# Patient Record
Sex: MALE | Race: WHITE | NOT HISPANIC OR LATINO | Employment: UNEMPLOYED | URBAN - METROPOLITAN AREA
[De-identification: names, ages, dates, MRNs, and addresses within clinical notes are randomized per-mention and may not be internally consistent; named-entity substitution may affect disease eponyms.]

---

## 2023-09-27 ENCOUNTER — OFFICE VISIT (OUTPATIENT)
Dept: URGENT CARE | Facility: CLINIC | Age: 83
End: 2023-09-27
Payer: MEDICARE

## 2023-09-27 VITALS
BODY MASS INDEX: 26.21 KG/M2 | HEART RATE: 61 BPM | OXYGEN SATURATION: 98 % | DIASTOLIC BLOOD PRESSURE: 81 MMHG | SYSTOLIC BLOOD PRESSURE: 158 MMHG | TEMPERATURE: 98 F | RESPIRATION RATE: 16 BRPM | WEIGHT: 167 LBS | HEIGHT: 67 IN

## 2023-09-27 DIAGNOSIS — R09.89 SYMPTOMS OF UPPER RESPIRATORY INFECTION (URI): Primary | ICD-10-CM

## 2023-09-27 DIAGNOSIS — J02.9 SORE THROAT: ICD-10-CM

## 2023-09-27 DIAGNOSIS — Z20.818 EXPOSURE TO STREP THROAT: ICD-10-CM

## 2023-09-27 LAB
CTP QC/QA: YES
FLUAV AG NPH QL: NEGATIVE
FLUBV AG NPH QL: NEGATIVE
MOLECULAR STREP A: NEGATIVE
SARS-COV-2 RDRP RESP QL NAA+PROBE: NEGATIVE

## 2023-09-27 PROCEDURE — 87651 STREP A DNA AMP PROBE: CPT | Mod: PBBFAC

## 2023-09-27 PROCEDURE — 99205 OFFICE O/P NEW HI 60 MIN: CPT | Mod: PBBFAC

## 2023-09-27 PROCEDURE — 87804 INFLUENZA ASSAY W/OPTIC: CPT | Mod: 59,PBBFAC

## 2023-09-27 PROCEDURE — 99204 PR OFFICE/OUTPT VISIT, NEW, LEVL IV, 45-59 MIN: ICD-10-PCS | Mod: S$PBB,,,

## 2023-09-27 PROCEDURE — 99204 OFFICE O/P NEW MOD 45 MIN: CPT | Mod: S$PBB,,,

## 2023-09-27 PROCEDURE — 87635 SARS-COV-2 COVID-19 AMP PRB: CPT | Mod: PBBFAC

## 2023-09-27 RX ORDER — OXYCODONE AND ACETAMINOPHEN 5; 325 MG/1; MG/1
1 TABLET ORAL
COMMUNITY
Start: 2023-09-12

## 2023-09-27 RX ORDER — UMECLIDINIUM BROMIDE AND VILANTEROL TRIFENATATE 62.5; 25 UG/1; UG/1
POWDER RESPIRATORY (INHALATION)
COMMUNITY
Start: 2023-06-09

## 2023-09-27 RX ORDER — TRAMADOL HYDROCHLORIDE 50 MG/1
50 TABLET ORAL EVERY 6 HOURS PRN
COMMUNITY
Start: 2023-08-14

## 2023-09-27 RX ORDER — AMOXICILLIN 500 MG/1
500 TABLET, FILM COATED ORAL EVERY 12 HOURS
Qty: 20 TABLET | Refills: 0 | Status: SHIPPED | OUTPATIENT
Start: 2023-09-27 | End: 2023-10-07

## 2023-09-27 RX ORDER — AMLODIPINE BESYLATE 10 MG/1
10 TABLET ORAL
COMMUNITY
Start: 2023-09-15

## 2023-09-27 RX ORDER — ONDANSETRON 4 MG/1
4 TABLET, ORALLY DISINTEGRATING ORAL EVERY 8 HOURS PRN
COMMUNITY
Start: 2023-09-12

## 2023-09-27 RX ORDER — PROMETHAZINE HYDROCHLORIDE AND DEXTROMETHORPHAN HYDROBROMIDE 6.25; 15 MG/5ML; MG/5ML
5 SYRUP ORAL EVERY 6 HOURS PRN
Qty: 180 ML | Refills: 0 | Status: SHIPPED | OUTPATIENT
Start: 2023-09-27 | End: 2023-10-07

## 2023-09-27 RX ORDER — FLUTICASONE PROPIONATE 50 MCG
1 SPRAY, SUSPENSION (ML) NASAL DAILY
Qty: 9.9 ML | Refills: 0 | Status: SHIPPED | OUTPATIENT
Start: 2023-09-27

## 2023-09-27 NOTE — PROGRESS NOTES
"Subjective:      Patient ID: Christopher Quiroga is a 83 y.o. male.    Vitals:  height is 5' 7" (1.702 m) and weight is 75.8 kg (167 lb). His temperature is 98.2 °F (36.8 °C). His blood pressure is 158/81 (abnormal) and his pulse is 61. His respiration is 16 and oxygen saturation is 98%.     Chief Complaint: URI (Cough, congestion, sore throat, fatigue x 3 days.)    PT states cough, sore throat and body aches and fatigue for the last 3 days. Pt states granddaughter also sick. Denies fever or SOB.      URI   Associated symptoms include congestion, coughing and a sore throat.       Constitution: Negative.   HENT:  Positive for congestion and sore throat.    Neck: neck negative.   Cardiovascular: Negative.    Eyes: Negative.    Respiratory:  Positive for cough and COPD.    Gastrointestinal: Negative.    Genitourinary: Negative.    Musculoskeletal: Negative.    Skin: Negative.    Neurological: Negative.       Objective:     Physical Exam   Constitutional: He is oriented to person, place, and time. normal  HENT:   Head: Normocephalic.   Ears:   Right Ear: Tympanic membrane, external ear and ear canal normal.   Left Ear: Tympanic membrane, external ear and ear canal normal.   Nose: Congestion present.   Mouth/Throat: Uvula is midline and mucous membranes are normal. Mucous membranes are moist. Posterior oropharyngeal edema present. Oropharynx is clear.   Eyes: Pupils are equal, round, and reactive to light.   Neck: Neck supple.   Cardiovascular: Normal rate, regular rhythm, normal heart sounds and normal pulses.   Pulmonary/Chest: Effort normal and breath sounds normal.   Abdominal: Normal appearance. Soft.   Musculoskeletal: Normal range of motion.         General: Normal range of motion.   Neurological: He is alert and oriented to person, place, and time.   Skin: Skin is warm and dry.   Vitals reviewed.    Results for orders placed or performed in visit on 09/27/23   POCT COVID-19 Rapid Screening   Result Value Ref Range    " POC Rapid COVID Negative Negative     Acceptable Yes    POCT Influenza A/B   Result Value Ref Range    Rapid Influenza A Ag Negative Negative    Rapid Influenza B Ag Negative Negative     Acceptable Yes    POCT Strep A, Molecular   Result Value Ref Range    Molecular Strep A, POC Negative Negative     Acceptable Yes          Assessment:     1. Symptoms of upper respiratory infection (URI)    2. Sore throat    3. Exposure to strep throat        Plan:       Symptoms of upper respiratory infection (URI)  -     POCT COVID-19 Rapid Screening  -     POCT Influenza A/B  -     POCT Strep A, Molecular  -     fluticasone propionate (FLONASE) 50 mcg/actuation nasal spray; 1 spray (50 mcg total) by Each Nostril route once daily.  Dispense: 9.9 mL; Refill: 0  -     promethazine-dextromethorphan (PROMETHAZINE-DM) 6.25-15 mg/5 mL Syrp; Take 5 mLs by mouth every 6 (six) hours as needed (cough).  Dispense: 180 mL; Refill: 0    Sore throat  -     POCT COVID-19 Rapid Screening  -     POCT Influenza A/B  -     POCT Strep A, Molecular    Exposure to strep throat  -     amoxicillin (AMOXIL) 500 MG Tab; Take 1 tablet (500 mg total) by mouth every 12 (twelve) hours. for 10 days  Dispense: 20 tablet; Refill: 0             Additional MDM:     Heart Failure Score:   COPD = Yes      Strep is a bacterial infection that may cause a sore, scratchy throat.  The infection is generally transmitted by direct contact is contagious through saliva. Do not share utensils, kiss, or pass saliva to another person.   Begin antibiotics today.   Get a new toothbrush.    Tylenol/ibuprofen as needed for fever and pain.  Drink plenty of fluids.    Soft diet as needed.      Please drink plenty of fluids.  Please get plenty of rest.    Take over the counter Tylenol (Acetaminophen) and/or Motrin (Ibuprofen) as directed for control of pain and/or fever.  Please follow up with your primary care doctor.     ER precautions  given, patient verbalized understanding.     Please see provided patient education for guidance.    Follow up with PCP or return to clinic if symptoms worsen or do not improve.

## 2025-04-17 ENCOUNTER — HOSPITAL ENCOUNTER (EMERGENCY)
Facility: HOSPITAL | Age: 85
Discharge: HOME | End: 2025-04-17
Attending: STUDENT IN AN ORGANIZED HEALTH CARE EDUCATION/TRAINING PROGRAM
Payer: MEDICARE

## 2025-04-17 VITALS
HEIGHT: 67 IN | WEIGHT: 158 LBS | HEART RATE: 56 BPM | RESPIRATION RATE: 18 BRPM | DIASTOLIC BLOOD PRESSURE: 63 MMHG | SYSTOLIC BLOOD PRESSURE: 136 MMHG | OXYGEN SATURATION: 96 % | TEMPERATURE: 98 F | BODY MASS INDEX: 24.8 KG/M2

## 2025-04-17 DIAGNOSIS — J18.9 PNEUMONIA OF RIGHT UPPER LOBE DUE TO INFECTIOUS ORGANISM: Primary | ICD-10-CM

## 2025-04-17 DIAGNOSIS — R06.02 SHORTNESS OF BREATH: ICD-10-CM

## 2025-04-17 DIAGNOSIS — R10.31 RLQ ABDOMINAL PAIN: ICD-10-CM

## 2025-04-17 DIAGNOSIS — J18.9 PNEUMONIA: ICD-10-CM

## 2025-04-17 LAB
ALBUMIN SERPL-MCNC: 3.8 G/DL (ref 3.4–4.8)
ALBUMIN/GLOB SERPL: 1 RATIO (ref 1.1–2)
ALP SERPL-CCNC: 56 UNIT/L (ref 40–150)
ALT SERPL-CCNC: 9 UNIT/L (ref 0–55)
ANION GAP SERPL CALC-SCNC: 9 MEQ/L
AST SERPL-CCNC: 15 UNIT/L (ref 11–45)
BACTERIA #/AREA URNS AUTO: ABNORMAL /HPF
BASOPHILS # BLD AUTO: 0.09 X10(3)/MCL
BASOPHILS NFR BLD AUTO: 0.9 %
BILIRUB SERPL-MCNC: 0.2 MG/DL
BILIRUB UR QL STRIP.AUTO: NEGATIVE
BNP BLD-MCNC: 14.5 PG/ML
BUN SERPL-MCNC: 29.4 MG/DL (ref 8.4–25.7)
CALCIUM SERPL-MCNC: 9.5 MG/DL (ref 8.8–10)
CHLORIDE SERPL-SCNC: 108 MMOL/L (ref 98–107)
CLARITY UR: CLEAR
CO2 SERPL-SCNC: 22 MMOL/L (ref 23–31)
COLOR UR AUTO: ABNORMAL
CREAT SERPL-MCNC: 1.56 MG/DL (ref 0.72–1.25)
CREAT/UREA NIT SERPL: 19
EOSINOPHIL # BLD AUTO: 0.45 X10(3)/MCL (ref 0–0.9)
EOSINOPHIL NFR BLD AUTO: 4.3 %
ERYTHROCYTE [DISTWIDTH] IN BLOOD BY AUTOMATED COUNT: 14.9 % (ref 11.5–17)
FLUAV AG UPPER RESP QL IA.RAPID: NOT DETECTED
FLUBV AG UPPER RESP QL IA.RAPID: NOT DETECTED
GFR SERPLBLD CREATININE-BSD FMLA CKD-EPI: 44 ML/MIN/1.73/M2
GLOBULIN SER-MCNC: 4 GM/DL (ref 2.4–3.5)
GLUCOSE SERPL-MCNC: 91 MG/DL (ref 82–115)
GLUCOSE UR QL STRIP: NORMAL
HCT VFR BLD AUTO: 33.7 % (ref 42–52)
HGB BLD-MCNC: 10.9 G/DL (ref 14–18)
HGB UR QL STRIP: NEGATIVE
HYALINE CASTS #/AREA URNS LPF: ABNORMAL /LPF
IMM GRANULOCYTES # BLD AUTO: 0.22 X10(3)/MCL (ref 0–0.04)
IMM GRANULOCYTES NFR BLD AUTO: 2.1 %
KETONES UR QL STRIP: NEGATIVE
LEUKOCYTE ESTERASE UR QL STRIP: NEGATIVE
LYMPHOCYTES # BLD AUTO: 3.21 X10(3)/MCL (ref 0.6–4.6)
LYMPHOCYTES NFR BLD AUTO: 30.7 %
MCH RBC QN AUTO: 32.8 PG (ref 27–31)
MCHC RBC AUTO-ENTMCNC: 32.3 G/DL (ref 33–36)
MCV RBC AUTO: 101.5 FL (ref 80–94)
MONOCYTES # BLD AUTO: 1.01 X10(3)/MCL (ref 0.1–1.3)
MONOCYTES NFR BLD AUTO: 9.7 %
MUCOUS THREADS URNS QL MICRO: ABNORMAL /LPF
NEUTROPHILS # BLD AUTO: 5.48 X10(3)/MCL (ref 2.1–9.2)
NEUTROPHILS NFR BLD AUTO: 52.3 %
NITRITE UR QL STRIP: NEGATIVE
NRBC BLD AUTO-RTO: 0 %
PH UR STRIP: 5.5 [PH]
PLATELET # BLD AUTO: 280 X10(3)/MCL (ref 130–400)
PMV BLD AUTO: 11.9 FL (ref 7.4–10.4)
POTASSIUM SERPL-SCNC: 4.9 MMOL/L (ref 3.5–5.1)
PROT SERPL-MCNC: 7.8 GM/DL (ref 5.8–7.6)
PROT UR QL STRIP: NEGATIVE
RBC # BLD AUTO: 3.32 X10(6)/MCL (ref 4.7–6.1)
RBC #/AREA URNS AUTO: ABNORMAL /HPF
SARS-COV-2 RNA RESP QL NAA+PROBE: NOT DETECTED
SODIUM SERPL-SCNC: 139 MMOL/L (ref 136–145)
SP GR UR STRIP.AUTO: 1.02 (ref 1–1.03)
SQUAMOUS #/AREA URNS LPF: ABNORMAL /HPF
TROPONIN I SERPL-MCNC: <0.01 NG/ML (ref 0–0.04)
UROBILINOGEN UR STRIP-ACNC: NORMAL
WBC # BLD AUTO: 10.46 X10(3)/MCL (ref 4.5–11.5)
WBC #/AREA URNS AUTO: ABNORMAL /HPF

## 2025-04-17 PROCEDURE — 84484 ASSAY OF TROPONIN QUANT: CPT

## 2025-04-17 PROCEDURE — 99285 EMERGENCY DEPT VISIT HI MDM: CPT | Mod: 25

## 2025-04-17 PROCEDURE — 96375 TX/PRO/DX INJ NEW DRUG ADDON: CPT

## 2025-04-17 PROCEDURE — 83880 ASSAY OF NATRIURETIC PEPTIDE: CPT

## 2025-04-17 PROCEDURE — 0240U COVID/FLU A&B PCR: CPT

## 2025-04-17 PROCEDURE — 93005 ELECTROCARDIOGRAM TRACING: CPT

## 2025-04-17 PROCEDURE — 81001 URINALYSIS AUTO W/SCOPE: CPT

## 2025-04-17 PROCEDURE — 96374 THER/PROPH/DIAG INJ IV PUSH: CPT | Mod: 59

## 2025-04-17 PROCEDURE — 85025 COMPLETE CBC W/AUTO DIFF WBC: CPT

## 2025-04-17 PROCEDURE — 93010 ELECTROCARDIOGRAM REPORT: CPT | Mod: ,,, | Performed by: INTERNAL MEDICINE

## 2025-04-17 PROCEDURE — 80053 COMPREHEN METABOLIC PANEL: CPT

## 2025-04-17 PROCEDURE — 25500020 PHARM REV CODE 255: Performed by: STUDENT IN AN ORGANIZED HEALTH CARE EDUCATION/TRAINING PROGRAM

## 2025-04-17 PROCEDURE — 63600175 PHARM REV CODE 636 W HCPCS: Performed by: STUDENT IN AN ORGANIZED HEALTH CARE EDUCATION/TRAINING PROGRAM

## 2025-04-17 RX ORDER — DOXYCYCLINE 100 MG/1
100 CAPSULE ORAL 2 TIMES DAILY
Qty: 20 CAPSULE | Refills: 0 | Status: SHIPPED | OUTPATIENT
Start: 2025-04-17 | End: 2025-04-27

## 2025-04-17 RX ORDER — MORPHINE SULFATE 4 MG/ML
4 INJECTION, SOLUTION INTRAMUSCULAR; INTRAVENOUS
Refills: 0 | Status: COMPLETED | OUTPATIENT
Start: 2025-04-17 | End: 2025-04-17

## 2025-04-17 RX ORDER — ONDANSETRON HYDROCHLORIDE 2 MG/ML
4 INJECTION, SOLUTION INTRAVENOUS
Status: COMPLETED | OUTPATIENT
Start: 2025-04-17 | End: 2025-04-17

## 2025-04-17 RX ADMIN — ONDANSETRON 4 MG: 2 INJECTION INTRAMUSCULAR; INTRAVENOUS at 08:04

## 2025-04-17 RX ADMIN — IOHEXOL 100 ML: 350 INJECTION, SOLUTION INTRAVENOUS at 08:04

## 2025-04-17 RX ADMIN — MORPHINE SULFATE 4 MG: 4 INJECTION INTRAVENOUS at 08:04

## 2025-04-17 NOTE — FIRST PROVIDER EVALUATION
"Medical screening examination initiated.  I have conducted a focused provider triage encounter, findings are as follows:    Brief history of present illness:  84 year old male presents to the emergency department with complaints of shortness of breath and cough x3 days.  Intermittent chest pain. Patient also reports right lower quadrant pain.    Vitals:    04/17/25 1730   BP: 130/83   BP Location: Left arm   Pulse: 83   Resp: 20   Temp: 98 °F (36.7 °C)   TempSrc: Oral   SpO2: 95%   Weight: 71.7 kg (158 lb)   Height: 5' 7" (1.702 m)       Pertinent physical exam:  Awake and alert, NAD    Brief workup plan:  Labs, UA, EKG, chest x-ray    Preliminary workup initiated; this workup will be continued and followed by the physician or advanced practice provider that is assigned to the patient when roomed.  "

## 2025-04-18 LAB
OHS QRS DURATION: 116 MS
OHS QTC CALCULATION: 445 MS

## 2025-04-18 NOTE — DISCHARGE INSTRUCTIONS

## 2025-04-18 NOTE — ED PROVIDER NOTES
Encounter Date: 4/17/2025    SCRIBE #1 NOTE: I, Mary Ellen Drake, am scribing for, and in the presence of,  Heath Rizzo MD.       History     Chief Complaint   Patient presents with    Shortness of Breath     Worsening SOB on exertion last 3 days. C/o productive cough. Also reports RLQ burning sensation. Denies any urinary complaints. C/o intermittent chest pain w exertion. Cardiologist is Compa.      Patient is an 84-year-old male with a history of COPD, GERD, and PVD presenting to the ED with c/o shortness of breath. The pt reports shortness of breath with associated productive cough that has been ongoing for the past several months. He states he scheduled to see Dr. Cummings to establish care with him on Monday and states he was told by him to present to the ED for a two-view chest x-ray. The pt denies any fever or chills. Patient is also has secondary complaint of a burning sensation to the skin overlying the right lower quadrant for the past month. The pt endorses smoking cigarettes.    The history is provided by the patient. No  was used.     Review of patient's allergies indicates:   Allergen Reactions    Clindamycin Rash     Past Medical History:   Diagnosis Date    Anxiety     Bronchitis     Chest pain     COPD with emphysema     Depression     Diverticulitis     Dizziness     Dyspnea on exertion     Fatigue     GERD (gastroesophageal reflux disease)     Hiatal hernia     Hip pain     Insomnia     Neuropathy     Peripheral nerve disease     SOB (shortness of breath)     Weakness present      Past Surgical History:   Procedure Laterality Date    APPENDECTOMY      BACK SURGERY      COLONOSCOPY      Dr Araya    CYST REMOVAL      neck cyst removed    ESOPHAGOGASTRODUODENOSCOPY      KNEE ARTHROSCOPY      LAPAROSCOPIC PARTIAL COLECTOMY      TONSILLECTOMY       Family History   Problem Relation Name Age of Onset    Emphysema Mother      Hypertension Father      Hyperlipidemia Father       Coronary artery disease Father      Heart attack Father       Social History[1]  Review of Systems   Constitutional:  Negative for chills and fever.   Respiratory:  Positive for cough and shortness of breath.    Gastrointestinal:  Negative for abdominal pain.   Skin:         Burning sensation overlying the RLQ.       Physical Exam     Initial Vitals [04/17/25 1730]   BP Pulse Resp Temp SpO2   130/83 83 20 98 °F (36.7 °C) 95 %      MAP       --         Physical Exam    Constitutional: He appears well-developed and well-nourished. He is not diaphoretic. No distress.   HENT:   Head: Normocephalic and atraumatic.   Right Ear: External ear normal.   Left Ear: External ear normal.   Nose: Nose normal.   Eyes: EOM are normal. Pupils are equal, round, and reactive to light. Right eye exhibits no discharge. Left eye exhibits no discharge.   Cardiovascular:  Normal rate, regular rhythm and normal heart sounds.     Exam reveals no gallop and no friction rub.       No murmur heard.  Pulmonary/Chest: Effort normal. No respiratory distress. He has no rhonchi. He has no rales. He exhibits no tenderness.   Wheezing bilaterally.   Abdominal: Abdomen is soft. Bowel sounds are normal. He exhibits no distension and no mass. There is no abdominal tenderness.   Well-healed midline abdominal scar. There is no rebound and no guarding.   Musculoskeletal:         General: No edema. Normal range of motion.     Neurological: He is alert and oriented to person, place, and time. No cranial nerve deficit or sensory deficit.   Skin: Skin is warm and dry. Capillary refill takes less than 2 seconds.         ED Course   Procedures  Labs Reviewed   COMPREHENSIVE METABOLIC PANEL - Abnormal       Result Value    Sodium 139      Potassium 4.9      Chloride 108 (*)     CO2 22 (*)     Glucose 91      Blood Urea Nitrogen 29.4 (*)     Creatinine 1.56 (*)     Calcium 9.5      Protein Total 7.8 (*)     Albumin 3.8      Globulin 4.0 (*)     Albumin/Globulin  Ratio 1.0 (*)     Bilirubin Total 0.2      ALP 56      ALT 9      AST 15      eGFR 44      Anion Gap 9.0      BUN/Creatinine Ratio 19     URINALYSIS, REFLEX TO URINE CULTURE - Abnormal    Color, UA Light-Yellow      Appearance, UA Clear      Specific Gravity, UA 1.025      pH, UA 5.5      Protein, UA Negative      Glucose, UA Normal      Ketones, UA Negative      Blood, UA Negative      Bilirubin, UA Negative      Urobilinogen, UA Normal      Nitrites, UA Negative      Leukocyte Esterase, UA Negative      RBC, UA None Seen      WBC, UA 0-5      Bacteria, UA None Seen      Squamous Epithelial Cells, UA None Seen      Mucous, UA Trace (*)     Hyaline Casts, UA 6-10 (*)    CBC WITH DIFFERENTIAL - Abnormal    WBC 10.46      RBC 3.32 (*)     Hgb 10.9 (*)     Hct 33.7 (*)     .5 (*)     MCH 32.8 (*)     MCHC 32.3 (*)     RDW 14.9      Platelet 280      MPV 11.9 (*)     Neut % 52.3      Lymph % 30.7      Mono % 9.7      Eos % 4.3      Basophil % 0.9      Imm Grans % 2.1      Neut # 5.48      Lymph # 3.21      Mono # 1.01      Eos # 0.45      Baso # 0.09      Imm Gran # 0.22 (*)     NRBC% 0.0     B-TYPE NATRIURETIC PEPTIDE - Normal    Natriuretic Peptide 14.5     TROPONIN I - Normal    Troponin-I <0.010     COVID/FLU A&B PCR - Normal    Influenza A PCR Not Detected      Influenza B PCR Not Detected      SARS-CoV-2 PCR Not Detected      Narrative:     The Xpert Xpress SARS-CoV-2/FLU/RSV plus is a rapid, multiplexed real-time PCR test intended for the simultaneous qualitative detection and differentiation of SARS-CoV-2, Influenza A, Influenza B, and respiratory syncytial virus (RSV) viral RNA in either nasopharyngeal swab or nasal swab specimens.         CBC W/ AUTO DIFFERENTIAL    Narrative:     The following orders were created for panel order CBC auto differential.  Procedure                               Abnormality         Status                     ---------                               -----------          ------                     CBC with Differential[[id number]]       Abnormal            Final result                 Please view results for these tests on the individual orders.          Imaging Results              CT Abdomen Pelvis With IV Contrast NO Oral Contrast (Preliminary result)  Result time 04/17/25 22:06:57      Preliminary result by Oscar Atkinson MD (04/17/25 22:06:57)                   Narrative:    START OF REPORT:  Technique: CT of the abdomen and pelvis was performed with axial images as well as sagittal and coronal reconstruction images with intravenous contrast.    Comparison: None available.    Clinical History: Right lower quadrant pain.    Dosage Information: Automated Exposure Control was utilized 380.34 mGy.cm.    Findings:  Lines and Tubes: None.  Thorax:  Lungs: There is mild nonspecific dependent change at the lung bases. There are some emphysematous change in the right lung base. No focal infiltrate or consolidation is seen.  Pleura: No effusions or thickening.  Heart: The heart size is within normal limits. Mild coronary artery calcification is seen.  Abdomen:  Abdominal Wall: No abdominal wall pathology is seen.  Liver: A tiny granuloma is seen in the lateral right hepatic lobe (image 25 series 2). The liver otherwise appears unremarkable.  Biliary System: No intrahepatic or extrahepatic biliary duct dilatation is seen.  Gallbladder: The gallbladder appears unremarkable.  Pancreas: Mild pancreatic atrophy is seen.  Spleen: The spleen appears unremarkable.  Adrenals: The adrenal glands appear unremarkable.  Kidneys: Multiple cysts are identified in the left kidney the largest of which measures 3.4 cm is on Image 54, Series 2 in the mid pole of the left kidney. Multiple cysts are identified in the right kidney the largest of which measures 5.8 cm is on Image 52, Series 2 in the mid pole of the right kidney. The kidneys appear unremarkable with no stones masses or hydronephrosis.  Aorta:  There is moderate calcification of the abdominal aorta and its branches.  IVC: Unremarkable.  Bowel:  Esophagus: The visualized esophagus appears unremarkable.  Stomach: The stomach appears unremarkable.  Duodenum: Unremarkable appearing duodenum.  Small Bowel: The small bowel appears unremarkable.  Colon: There is moderate stool in the colon which could reflect an element of constipation. A few diverticula are seen in the sigmoid colon. No associated inflammatory stranding is seen to suggest diverticulitis. Surgical suture is seen in the distal rectum.  Appendix: The appendix is not identified but no inflammatory changes are seen in the right lower quadrant to suggest appendicitis.  Peritoneum: No intraperitoneal free air or ascites is seen.    Pelvis:  Bladder: The bladder appears unremarkable.  Male:  Prostate gland: The prostate gland is mildly enlarged. There are multiple calcifications in the prostate gland. A 2.3 cm peripherally calcified cyst is seen in the prostate gland base on image 126 series 2.    Bony structures:  Dorsal Spine: There is moderate spondylosis of the visualized dorsal spine.  Bony Pelvis: There is mild degenerative change of the bilateral hips.      Impression:  1. There is moderate stool in the colon which could reflect an element of constipation.  2. The prostate gland is mildly enlarged. There are multiple calcifications in the prostate gland. A 2.3 cm peripherally calcified cyst is seen in the prostate gland base on image 126 series 2. The possibility of an infectious component within this fluid collection is not entirely excluded. Correlate with clinical and laboratory findings as regards additional evaluation and follow-up.  3. No acute intraabdominal or pelvic solid organ or bowel pathology identified. Details and other findings as discussed above.                                         X-Ray Chest PA And Lateral (In process)                       X-Ray Chest AP Portable (Final  result)  Result time 04/17/25 17:45:12      Final result by Gopi Al MD (04/17/25 17:45:12)                   Narrative:    EXAMINATION  XR CHEST AP PORTABLE    CLINICAL HISTORY  Shortness of breath    TECHNIQUE  A total of 1 frontal image(s) of the chest.    COMPARISON  None available at the time of initial interpretation.    FINDINGS  Lines/tubes/devices: none present    The cardiomediastinal silhouette and central pulmonary vasculature are unremarkable for utilized technique.  The trachea is midline.  There is ill-defined focal opacity at the right upper lung zone, with no organized lobar consolidation identified.  No large pleural effusion or convincing pneumothorax.    There is no acute osseous or extrathoracic abnormality.    IMPRESSION  Ill-defined right upper lung hazy opacity may reflect developing infectious infiltrate.    ==========    Recommendations: Follow-up 2-view chest radiographs recommended in 6-8 weeks in order to ensure resolution of suspected infectious/inflammatory lung findings.    This report was flagged in Epic as abnormal.      Electronically signed by: Gopi Al  Date:    04/17/2025  Time:    17:45                                     Medications   morphine injection 4 mg (4 mg Intravenous Given 4/17/25 2038)   ondansetron injection 4 mg (4 mg Intravenous Given 4/17/25 2038)   iohexoL (OMNIPAQUE 350) injection 100 mL (100 mLs Intravenous Given 4/17/25 2057)     Medical Decision Making  Differential diagnosis include but are not limited to: COPD, asthma, pneumonia, viral syndrome, PE, pneumothorax, congestive heart failure, CAD, MI, pericarditis, anemia, electrolyte abnormality, hypotension, pleural effusion, appendiceal stump pain, diverticulitis, colitis, small-bowel obstruction     Amount and/or Complexity of Data Reviewed  External Data Reviewed: notes.     Details: Chart review reveals patient has close follow up with the pulmonology 4/21.  Has been seen in the past for  acute cystitis as well as URI like symptoms  Labs: ordered. Decision-making details documented in ED Course.  Radiology: ordered and independent interpretation performed. Decision-making details documented in ED Course.    Risk  Prescription drug management.            Scribe Attestation:   Scribe #1: I performed the above scribed service and the documentation accurately describes the services I performed. I attest to the accuracy of the note.    Attending Attestation:           Physician Attestation for Scribe:  Physician Attestation Statement for Scribe #1: I, Heath Rizzo MD, reviewed documentation, as scribed by Mary Ellen Drake in my presence, and it is both accurate and complete.             ED Course as of 04/17/25 2242   Thu Apr 17, 2025   1904 BNP: 14.5 [MM]   1904 Troponin I: <0.010 [MM]   1904 Sodium: 139 [MM]   1904 Potassium: 4.9 [MM]   1904 Chloride(!): 108 [MM]   1904 CO2(!): 22 [MM]   1904 BUN(!): 29.4 [MM]   1904 Creatinine(!): 1.56 [MM]   1904 WBC: 10.46 [MM]   1904 Hemoglobin(!): 10.9 [MM]   1904 Hematocrit(!): 33.7 [MM]   1904 BNP: 14.5 [MM]   1904 Influenza A, Molecular: Not Detected [MM]   1904 Influenza B, Molecular: Not Detected [MM]   1904 SARS-CoV2 (COVID-19) Qualitative PCR: Not Detected [MM]   1904 X-Ray Chest AP Portable(!)  Opacity R U Lung [MM]   2235 On re-evaluation patient is resting comfortably reports he feels better.  Discussed largely negative workup here other than chest x-ray concerning for pneumonia.  He otherwise oxygen saturation is reasonable he is not short of breath he has no chest pain he does endorse a productive cough so symptomatically his diagnosis pneumonia fits.  His CT abdomen pelvis with IV contrast does not show any obvious acute process.  He has a history of appendectomy.  No colitis or diverticulitis.  That has mentioned with a possible fluid collection associated with his prostate but he has no significant abdominal pain in this area.  His urinalysis is  unrevealing he has no significant leukocytosis no fever.  He does report history of enlarged prostate.  I do not believe this requires further, emergent intervention or further workup at this time.  I believe he is suitable for discharge with antibiotics both patient, wife in room comfortable with the plan.  They do have close follow up with the pulmonology this coming Monday 4/21. Return precautions given.  Questions invited, questions answered to the best my ability.  Patient discharged home condition stable.   [MM]      ED Course User Index  [MM] Heath Rizzo MD                           Clinical Impression:  Final diagnoses:  [R06.02] Shortness of breath  [J18.9] Pneumonia  [R10.31] RLQ abdominal pain  [J18.9] Pneumonia of right upper lobe due to infectious organism (Primary)          ED Disposition Condition    Discharge Stable          ED Prescriptions       Medication Sig Dispense Start Date End Date Auth. Provider    doxycycline (VIBRAMYCIN) 100 MG Cap Take 1 capsule (100 mg total) by mouth 2 (two) times daily. for 10 days 20 capsule 4/17/2025 4/27/2025 Heath Rizzo MD          Follow-up Information       Follow up With Specialties Details Why Contact [facility], [facility]    [address] [address]  [phone]      [facility] - Emergency Dept Emergency Medicine Go to  If symptoms worsen [address] [address] [phone]               [1]   Social History  Tobacco Use    Smoking status: Some Days     Types: Cigarettes    Smokeless tobacco: Never        Heath Rizzo MD  04/17/25 4940

## 2025-04-21 DIAGNOSIS — R13.10 PROBLEMS WITH SWALLOWING AND MASTICATION: Primary | ICD-10-CM

## 2025-04-21 DIAGNOSIS — R13.10 DYSPHAGIA: Primary | ICD-10-CM

## 2025-04-29 ENCOUNTER — CLINICAL SUPPORT (OUTPATIENT)
Dept: REHABILITATION | Facility: HOSPITAL | Age: 85
End: 2025-04-29
Attending: INTERNAL MEDICINE
Payer: MEDICARE

## 2025-04-29 ENCOUNTER — HOSPITAL ENCOUNTER (OUTPATIENT)
Dept: RADIOLOGY | Facility: HOSPITAL | Age: 85
Discharge: HOME | End: 2025-04-29
Attending: INTERNAL MEDICINE
Payer: MEDICARE

## 2025-04-29 DIAGNOSIS — R13.10 DYSPHAGIA, UNSPECIFIED TYPE: ICD-10-CM

## 2025-04-29 PROCEDURE — 74230 X-RAY XM SWLNG FUNCJ C+: CPT | Mod: TC

## 2025-04-29 PROCEDURE — 92611 MOTION FLUOROSCOPY/SWALLOW: CPT

## 2025-04-29 PROCEDURE — 25500020 PHARM REV CODE 255: Performed by: INTERNAL MEDICINE

## 2025-04-29 PROCEDURE — A9698 NON-RAD CONTRAST MATERIALNOC: HCPCS | Performed by: INTERNAL MEDICINE

## 2025-04-29 RX ADMIN — BARIUM SULFATE 10 ML: 0.81 POWDER, FOR SUSPENSION ORAL at 01:04

## 2025-04-29 NOTE — PROGRESS NOTES
"[facility]  Speech Language Pathology Department  Outpatient Modified Barium Swallow Study    Patient Name:  Christopher Quiroga   MRN:  [id number]    Recommendations     General recommendations:  consider esophagram, GI consult for hx of esophageal strictures and follow up with PCP/pulmonology  as scheduled  Repeat MBS study: n/a  Diet texture/consistency recommendations: Regular solids (IDDSI 7) and thin liquids (IDDSI 0)  Medications: per patient preference  Swallow strategies/precautions: small bites/sips and slow rate  General precautions: aspiration/GERD    History/Reason for Referral     Christopher Quiroga is a/n 84 y.o. male referred by Dr. Cummings for a Modified Barium Swallow Study due to suspicions for aspiration.    Past medical history includes COPD, recent right upper lobe pneumonia, GERD, and hiatal hernia.  Pt self reports recent 20lbs weight loss and "being in this depression where I just don't want to eat"    Pt denies a history of CVA, cancer, neck surgery.    Home diet texture/consistency: Regular and thin liquids  Current Method of Nutrition:  PO intake    Patient complaint: difficulty with steak and bread, poor appetite    Subjective     Patient awake, alert, and flat.  Spiritual/Cultural/Buddhism Beliefs/Practices that affect care: none    Pain/Comfort: no c/o pain    Respiratory Status:  room air    Restraints/positioning devices: none    Radiologist: Aristeo Siegel MD    Fluoroscopic Findings     Oral Musculature  Dentition: own teeth  Secretion Management: adequate  Mucosal Quality: good  Facial Movement: WFL  Buccal Strength & Mobility: WFL  Mandibular Strength & Mobility: WFL  Oral Labial Strength & Mobility: WFL  Lingual Strength & Mobility: WFL  Velar Elevation: WFL  Vocal Quality: adequate    Setup  Seated in straight chair  Able to self feed  Adequate head control    Visualization  Lateral view    Oral Phase:   Adequate lip closure  Bolus holding  Adequate " mastication  Adequate bolus cohesion  Adequate anterior-posterior transport    Pharyngeal Phase:   Timely swallow reflex  Adequate base of tongue retraction  Adequate epiglottic deflection  Adequate hyolaryngeal excursion  Adequate airway protection  Consistency Laryngeal Penetration Aspiration Residue   Thin liquid by cup None None None   Puree None None None   Chewable solid None None None   Thin liquid by straw None None None     Cervical Esophageal Phase:   UES appeared to accommodate all bolus types without stasis or retrograde movement visualized    Assessment     Oropharyngeal swallow WFL with no laryngeal penetration or aspiration visualized during this study.     Patient Education     Patient provided with verbal education regarding results/recommendations.  Understanding was verbalized.    Time Tracking     SLP Treatment Date:  2/29/2025  Speech Start Time:  1305  Speech Stop Time:  1325     Speech Total Time (min):  20 minutes    Billable minutes:   Motion Fluoroscopic Evaluation, Video Recording, 20 minutes     04/29/2025